# Patient Record
(demographics unavailable — no encounter records)

---

## 2024-10-09 NOTE — PHYSICAL EXAM
[Cranial Nerves Optic (II)] : visual acuity intact bilaterally,  visual fields full to confrontation, pupils equal round and reactive to light [Cranial Nerves Oculomotor (III)] : extraocular motion intact [Motor Strength] : muscle strength was normal in all four extremities [Motor Handedness Right-Handed] : the patient is right hand dominant [Limited Balance] : the patient's balance was impaired [Person] : disoriented to person [Place] : disoriented to place [Time] : disoriented to time [Short Term Intact] : short term memory impaired [Registration Intact] : recent registration memory impaired [Span Intact] : the attention span was decreased [Fluency] : fluency not intact [FreeTextEntry4] : autistic behavior. No language. Total care [FreeTextEntry6] : tremor
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
## 2025-06-25 NOTE — PHYSICAL EXAM
[Cranial Nerves Optic (II)] : visual acuity intact bilaterally,  visual fields full to confrontation, pupils equal round and reactive to light [Cranial Nerves Oculomotor (III)] : extraocular motion intact [Motor Strength] : muscle strength was normal in all four extremities [Motor Handedness Right-Handed] : the patient is right hand dominant [Limited Balance] : the patient's balance was impaired [1+] : Brachioradialis left 1+ [Person] : disoriented to person [Place] : disoriented to place [Time] : disoriented to time [Short Term Intact] : short term memory impaired [Registration Intact] : recent registration memory impaired [Span Intact] : the attention span was decreased [Fluency] : fluency not intact [FreeTextEntry4] : autistic behavior. No language. Total care [FreeTextEntry6] : tremor
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
